# Patient Record
Sex: FEMALE | Race: WHITE | NOT HISPANIC OR LATINO | Employment: UNEMPLOYED | URBAN - NONMETROPOLITAN AREA
[De-identification: names, ages, dates, MRNs, and addresses within clinical notes are randomized per-mention and may not be internally consistent; named-entity substitution may affect disease eponyms.]

---

## 2022-10-25 ENCOUNTER — HOSPITAL ENCOUNTER (EMERGENCY)
Facility: HOSPITAL | Age: 62
Discharge: HOME | End: 2022-10-25
Attending: EMERGENCY MEDICINE
Payer: COMMERCIAL

## 2022-10-25 ENCOUNTER — APPOINTMENT (EMERGENCY)
Dept: RADIOLOGY | Facility: HOSPITAL | Age: 62
End: 2022-10-25
Payer: COMMERCIAL

## 2022-10-25 VITALS
SYSTOLIC BLOOD PRESSURE: 142 MMHG | TEMPERATURE: 98.3 F | HEART RATE: 82 BPM | RESPIRATION RATE: 18 BRPM | DIASTOLIC BLOOD PRESSURE: 75 MMHG | WEIGHT: 199.08 LBS | OXYGEN SATURATION: 95 %

## 2022-10-25 DIAGNOSIS — J40 BRONCHITIS: Primary | ICD-10-CM

## 2022-10-25 PROCEDURE — 71045 X-RAY EXAM CHEST 1 VIEW: CPT

## 2022-10-25 PROCEDURE — 99284 EMERGENCY DEPT VISIT MOD MDM: CPT | Performed by: EMERGENCY MEDICINE

## 2022-10-25 RX ORDER — AZITHROMYCIN 250 MG/1
TABLET, FILM COATED ORAL
Qty: 4 TABLET | Refills: 0 | Status: SHIPPED | OUTPATIENT
Start: 2022-10-25 | End: 2022-10-26

## 2022-10-25 RX ORDER — PREDNISONE 20 MG/1
60 TABLET ORAL DAILY
Qty: 12 TABLET | Refills: 0 | Status: SHIPPED | OUTPATIENT
Start: 2022-10-26 | End: 2022-10-30

## 2022-10-25 RX ORDER — AZITHROMYCIN 250 MG/1
500 TABLET, FILM COATED ORAL ONCE
Status: COMPLETED | OUTPATIENT
Start: 2022-10-25 | End: 2022-10-25

## 2022-10-25 RX ORDER — PREDNISONE 20 MG/1
60 TABLET ORAL ONCE
Status: COMPLETED | OUTPATIENT
Start: 2022-10-25 | End: 2022-10-25

## 2022-10-25 RX ADMIN — AZITHROMYCIN MONOHYDRATE 500 MG: 250 TABLET ORAL at 16:26

## 2022-10-25 RX ADMIN — PREDNISONE 60 MG: 20 TABLET ORAL at 16:26

## 2022-10-25 NOTE — ED PROVIDER NOTES
History  Chief Complaint   Patient presents with   • Cough     Patient states she has had a productive cough with thick yellow sputum for 2 months  Patient states she was diagnosed with the flu 3 weeks ago  70-year-old female presents for evaluation of cough  Patient reports cough starting approximately 2 months ago, at the onset she was placed on amoxicillin and given promethazine syrup from her PCP  Patient believes her symptoms did eventually start to remit however over the last 2 weeks they seem to have returned  Patient reports a intermittent sputum producing cough with yellow sputum, denies hemoptysis  She does report shortness of breath with coughing fits and believes movement exacerbates the cough  She denies other cold symptoms including a headaches, ear pain, sore throat, nasal congestion  She has been eating and drinking normally  Denies chest pain, abdominal pain, nausea or vomiting  She denies sick contacts  She does report history of asthma and has been using her inhalers at home  None       Past Medical History:   Diagnosis Date   • Arthritis    • Asthma    • COPD (chronic obstructive pulmonary disease) ([facility] [address] )        Past Surgical History:   Procedure Laterality Date   • TUBAL LIGATION         History reviewed  No pertinent family history  I have reviewed and agree with the history as documented  E-Cigarette/Vaping   • E-Cigarette Use Never User      E-Cigarette/Vaping Substances     Social History     Tobacco Use   • Smoking status: Never Smoker   • Smokeless tobacco: Never Used   Vaping Use   • Vaping Use: Never used   Substance Use Topics   • Drug use: Never       Review of Systems   Constitutional: Negative for activity change, appetite change, chills and fever  HENT: Negative for congestion, ear pain and sore throat  Respiratory: Positive for cough  Cardiovascular: Negative for chest pain and leg swelling     Gastrointestinal: Negative for abdominal pain, constipation, diarrhea, nausea and vomiting  Musculoskeletal: Negative for myalgias  Neurological: Negative for headaches  All other systems reviewed and are negative  Physical Exam  Physical Exam  Vitals reviewed  Constitutional:       General: She is not in acute distress  Appearance: Normal appearance  She is not ill-appearing, toxic-appearing or diaphoretic  HENT:      Head: Normocephalic and atraumatic  Right Ear: External ear normal       Left Ear: External ear normal       Nose: Nose normal       Mouth/Throat:      Mouth: Mucous membranes are moist    Eyes:      General: No scleral icterus  Right eye: No discharge  Left eye: No discharge  Extraocular Movements: Extraocular movements intact  Cardiovascular:      Rate and Rhythm: Normal rate and regular rhythm  Pulmonary:      Effort: Pulmonary effort is normal  No respiratory distress  Breath sounds: Normal breath sounds  No stridor  No wheezing, rhonchi or rales  Musculoskeletal:         General: No deformity or signs of injury  Right lower leg: No edema  Left lower leg: No edema  Skin:     General: Skin is warm  Coloration: Skin is not jaundiced or pale  Neurological:      General: No focal deficit present  Mental Status: She is alert  Mental status is at baseline        Gait: Gait normal          Vital Signs  ED Triage Vitals [10/25/22 1545]   Temperature Pulse Respirations Blood Pressure SpO2   98 3 °F (36 8 °C) 82 18 142/75 95 %      Temp Source Heart Rate Source Patient Position - Orthostatic VS BP Location FiO2 (%)   Tympanic Monitor Lying Right arm --      Pain Score       No Pain           Vitals:    10/25/22 1545   BP: 142/75   Pulse: 82   Patient Position - Orthostatic VS: Lying         Visual Acuity      ED Medications  Medications   azithromycin (ZITHROMAX) tablet 500 mg (500 mg Oral Given 10/25/22 1626)   predniSONE tablet 60 mg (60 mg Oral Given 10/25/22 1626) Diagnostic Studies  Results Reviewed     None                 XR chest 1 view portable    (Results Pending)              Procedures  Procedures         ED Course  ED Course as of 10/25/22 [address] Oct 25, 2022   1548 Blood Pressure: 142/75   1548 Temperature: 98 3 °F (36 8 °C)   1548 Pulse: 82   1548 Respirations: 18   1548 SpO2: 95 %   1618 XR chest 1 view portable  No focal consolidation, no acute cardiopulmonary disease                                             MDM  Number of Diagnoses or Management Options  Bronchitis  Diagnosis management comments: 61-year-old female presents for evaluation of cough  Patient reports symptoms started 2 months ago seem to remit however returned 2 weeks ago  Patient does report history of asthma, denies smoking  Likely element of bronchitis  Will obtain x-ray to rule out pneumonia, if no over pneumonia will treat with azithromycin and steroid burst   Patient reports this usually helps with her coughing flares  Disposition  Final diagnoses:   Bronchitis     Time reflects when diagnosis was documented in both MDM as applicable and the Disposition within this note     Time User Action Codes Description Comment    10/25/2022  4:19 PM Gabe [place] Add [J40] Bronchitis       ED Disposition     ED Disposition   Discharge    Condition   Stable    Date/Time   Tue Oct 25, 2022  4:19 PM    Comment   Jenna Monroe discharge to home/self care                 Follow-up Information     Follow up With Specialties Details Why Contact Info    [employer]   You may use this number to establish a [phone]            Discharge Medication List as of 10/25/2022  4:22 PM      START taking these medications    Details   azithromycin (ZITHROMAX) 250 mg tablet Take one tablet daily, Normal      predniSONE 20 mg tablet Take 3 tablets (60 mg total) by mouth daily for 4 days Do not start before October 26, 2022 , Starting Wed 10/26/2022, Until Sun 10/30/2022, Normal             No discharge procedures on file      PDMP Review     None          ED Provider  Electronically Signed by           Meggan Martinez DO  10/25/22 0955

## 2022-11-01 ENCOUNTER — HOSPITAL ENCOUNTER (EMERGENCY)
Facility: HOSPITAL | Age: 62
Discharge: HOME | End: 2022-11-01
Attending: EMERGENCY MEDICINE

## 2022-11-01 ENCOUNTER — APPOINTMENT (EMERGENCY)
Dept: RADIOLOGY | Facility: HOSPITAL | Age: 62
End: 2022-11-01

## 2022-11-01 VITALS
HEART RATE: 102 BPM | DIASTOLIC BLOOD PRESSURE: 70 MMHG | TEMPERATURE: 99.2 F | RESPIRATION RATE: 17 BRPM | OXYGEN SATURATION: 92 % | SYSTOLIC BLOOD PRESSURE: 145 MMHG

## 2022-11-01 DIAGNOSIS — R06.00 DYSPNEA: ICD-10-CM

## 2022-11-01 DIAGNOSIS — J40 BRONCHITIS: Primary | ICD-10-CM

## 2022-11-01 DIAGNOSIS — R05.9 COUGH: ICD-10-CM

## 2022-11-01 LAB
ATRIAL RATE: 100 BPM
FLUAV RNA RESP QL NAA+PROBE: NEGATIVE
FLUBV RNA RESP QL NAA+PROBE: NEGATIVE
P AXIS: 77 DEGREES
PR INTERVAL: 130 MS
QRS AXIS: 72 DEGREES
QRSD INTERVAL: 72 MS
QT INTERVAL: 338 MS
QTC INTERVAL: 436 MS
RSV RNA RESP QL NAA+PROBE: NEGATIVE
SARS-COV-2 RNA RESP QL NAA+PROBE: NEGATIVE
T WAVE AXIS: 71 DEGREES
VENTRICULAR RATE: 100 BPM

## 2022-11-01 RX ORDER — IPRATROPIUM BROMIDE AND ALBUTEROL SULFATE 2.5; .5 MG/3ML; MG/3ML
3 SOLUTION RESPIRATORY (INHALATION) 4 TIMES DAILY
Qty: 1080 ML | Refills: 0 | Status: SHIPPED | OUTPATIENT
Start: 2022-11-01 | End: 2023-01-30

## 2022-11-01 RX ORDER — BENZONATATE 100 MG/1
100 CAPSULE ORAL EVERY 8 HOURS
Qty: 21 CAPSULE | Refills: 0 | Status: SHIPPED | OUTPATIENT
Start: 2022-11-01

## 2022-11-01 RX ORDER — BENZONATATE 100 MG/1
100 CAPSULE ORAL ONCE
Status: COMPLETED | OUTPATIENT
Start: 2022-11-01 | End: 2022-11-01

## 2022-11-01 RX ADMIN — ALBUTEROL SULFATE 5 MG: 2.5 SOLUTION RESPIRATORY (INHALATION) at 16:16

## 2022-11-01 RX ADMIN — BENZONATATE 100 MG: 100 CAPSULE ORAL at 17:15

## 2022-11-01 RX ADMIN — IPRATROPIUM BROMIDE 0.5 MG: 0.5 SOLUTION RESPIRATORY (INHALATION) at 16:16

## 2022-11-01 NOTE — Clinical Note
Pratima Gilbert was seen and treated in our emergency department on 11/1/2022  Diagnosis:     Dottie Adkins  may return to work on return date  She may return on this date: 11/07/2022         If you have any questions or concerns, please don't hesitate to call        Wanda Lopez, DO    ______________________________           _______________          _______________  Hospital Representative                              Date                                Time

## 2022-11-01 NOTE — DISCHARGE INSTRUCTIONS
Rest and take medications as directed  Follow up with your primary care physician next available appointment  Return to emergency department if condition worsens

## 2022-11-01 NOTE — ED PROVIDER NOTES
History  Chief Complaint   Patient presents with   • Shortness of Breath     Patient states she was seen here a week ago and diagnosed with Bronchitis and put on prednisone and zithromax  Patient went back to work today and states she became feeling a lot worse  Patient complains of cough, SOB, chills, chest pain when coughing and back pain  Patient is a 63-year-old female presenting with cough and shortness of breath  Patient was here approximately 1 week ago and diagnosed with bronchitis and was placed on prednisone as well as Zithromax  Patient states her symptoms improved and her breathing improved  She does have a history of asthma  She does taking a albuterol inhaler but has not used nebulizer treatments at home  Denies fevers or chills  She states she went back to work today but her breathing became more labored and she was coughing quite frequently throughout the day that they sent her home from work  Patient denies any fevers or chills  Denies any chest pain  Denies any headaches  Feels generalized fatigue  Shortness of breath is worsened by coughing  No orthopnea  No history DVT or PE  No recent travel  Nonsmoker  Previous records reviewed  None       Past Medical History:   Diagnosis Date   • Arthritis    • Asthma    • COPD (chronic obstructive pulmonary disease) ([facility] [address] )        Past Surgical History:   Procedure Laterality Date   • TUBAL LIGATION         History reviewed  No pertinent family history  I have reviewed and agree with the history as documented  E-Cigarette/Vaping   • E-Cigarette Use Never User      E-Cigarette/Vaping Substances     Social History     Tobacco Use   • Smoking status: Never Smoker   • Smokeless tobacco: Never Used   Vaping Use   • Vaping Use: Never used   Substance Use Topics   • Drug use: Never       Review of Systems   Constitutional: Negative for appetite change, chills, fatigue, fever and unexpected weight change     HENT: Negative for congestion, ear pain, rhinorrhea and sore throat  Eyes: Negative for pain and visual disturbance  Respiratory: Positive for cough and shortness of breath  Negative for chest tightness and wheezing  Cardiovascular: Negative for chest pain, palpitations and leg swelling  Gastrointestinal: Negative for abdominal pain, constipation, diarrhea, nausea and vomiting  Genitourinary: Negative for difficulty urinating, dysuria, frequency, hematuria, menstrual problem, pelvic pain, vaginal bleeding and vaginal discharge  Musculoskeletal: Negative for arthralgias, back pain and neck pain  Skin: Negative for color change and rash  Neurological: Negative for dizziness, seizures, syncope, light-headedness and headaches  Psychiatric/Behavioral: Negative for confusion and sleep disturbance  The patient is not nervous/anxious  All other systems reviewed and are negative  Physical Exam  Physical Exam  Vitals and nursing note reviewed  Constitutional:       General: She is not in acute distress  Appearance: She is well-developed  She is not ill-appearing, toxic-appearing or diaphoretic  Comments: Non-productive cough during exam   HENT:      Head: Normocephalic and atraumatic  Mouth/Throat:      Mouth: Mucous membranes are moist    Eyes:      General: No scleral icterus  Extraocular Movements: Extraocular movements intact  Conjunctiva/sclera: Conjunctivae normal    Neck:      Vascular: No JVD  Cardiovascular:      Rate and Rhythm: Normal rate and regular rhythm  Pulses: Normal pulses  Heart sounds: Normal heart sounds  No murmur heard  No friction rub  No gallop  Pulmonary:      Effort: Pulmonary effort is normal  No tachypnea, accessory muscle usage or respiratory distress  Breath sounds: Normal breath sounds  No stridor  No decreased breath sounds, wheezing, rhonchi or rales  Chest:      Chest wall: No mass, deformity or crepitus     Abdominal: Palpations: Abdomen is soft  There is no mass  Tenderness: There is no abdominal tenderness  There is no guarding or rebound  Hernia: No hernia is present  Musculoskeletal:         General: Normal range of motion  Cervical back: Normal range of motion and neck supple  Right lower leg: No tenderness  No edema  Left lower leg: No tenderness  No edema  Lymphadenopathy:      Cervical: No cervical adenopathy  Skin:     General: Skin is warm and dry  Capillary Refill: Capillary refill takes less than 2 seconds  Coloration: Skin is not cyanotic or pale  Findings: No erythema  Nails: There is no clubbing  Neurological:      General: No focal deficit present  Mental Status: She is alert and oriented to person, place, and time     Psychiatric:         Mood and Affect: Mood normal          Behavior: Behavior normal          Vital Signs  ED Triage Vitals [11/01/22 1532]   Temperature Pulse Respirations Blood Pressure SpO2   99 2 °F (37 3 °C) 102 17 145/70 92 %      Temp Source Heart Rate Source Patient Position - Orthostatic VS BP Location FiO2 (%)   Temporal Monitor -- -- --      Pain Score       --           Vitals:    11/01/22 1532   BP: 145/70   Pulse: 102         Visual Acuity      ED Medications  Medications   benzonatate (TESSALON PERLES) capsule 100 mg (has no administration in time range)   ipratropium (ATROVENT) 0 02 % inhalation solution 0 5 mg (0 5 mg Nebulization Given 11/1/22 1616)   albuterol inhalation solution 5 mg (5 mg Nebulization Given 11/1/22 1616)       Diagnostic Studies  Results Reviewed     Procedure Component Value Units Date/Time    FLU/RSV/COVID - if FLU/RSV clinically relevant [[id number]]  (Normal) Collected: 11/01/22 1616    Lab Status: Final result Specimen: Nares from Nose Updated: 11/01/22 1659     SARS-CoV-2 Negative     INFLUENZA A PCR Negative     INFLUENZA B PCR Negative     RSV PCR Negative    Narrative:      FOR PEDIATRIC PATIENTS - copy/paste COVID Guidelines URL to browser: [email or web address]  [email or web address]    SARS-CoV-2 assay is a Nucleic Acid Amplification assay intended for the  qualitative detection of nucleic acid from SARS-CoV-2 in nasopharyngeal  swabs  Results are for the presumptive identification of SARS-CoV-2 RNA  Positive results are indicative of infection with SARS-CoV-2, the virus  causing COVID-19, but do not rule out bacterial infection or co-infection  with other viruses  Laboratories within the [place] and its  territories are required to report all positive results to the appropriate  public health authorities  Negative results do not preclude SARS-CoV-2  infection and should not be used as the sole basis for treatment or other  patient management decisions  Negative results must be combined with  clinical observations, patient history, and epidemiological information  This test has not been FDA cleared or approved  This test has been authorized by FDA under an Emergency Use Authorization  (EUA)  This test is only authorized for the duration of time the  declaration that circumstances exist justifying the authorization of the  emergency use of an in vitro diagnostic tests for detection of SARS-CoV-2  virus and/or diagnosis of COVID-19 infection under section 564(b)(1) of  the Act, 21 U  S C  432ABT-4(A)(3), unless the authorization is terminated  or revoked sooner  The test has been validated but independent review by FDA  and CLIA is pending  Test performed using [employer] Gene[employer]pert: This RT-PCR assay targets N2,  a region unique to SARS-CoV-2  A conserved region in the E-gene was chosen  for pan-Sarbecovirus detection which includes SARS-CoV-2  According to CMS-2020-01-R, this platform meets the definition of high-throughput technology                   XR chest 1 view portable    (Results Pending)       Wet read:     No definitive infiltrate or pneumothorax or pleural effusion    Procedures  ECG 12 Lead Documentation Only    Date/Time: 11/1/2022 4:08 PM  Performed by: Patti Forrest DO  Authorized by: Patti Forrest DO     Indications / Diagnosis:  Cough/SOB  ECG reviewed by me, the ED Provider: yes    Patient location:  ED  Rate:     ECG rate:  98    ECG rate assessment: normal    Rhythm:     Rhythm: sinus rhythm    Ectopy:     Ectopy: none    QRS:     QRS axis:  Normal    QRS intervals:  Normal  Conduction:     Conduction: normal    ST segments:     ST segments:  Normal  T waves:     T waves: normal               ED Course                               SBIRT 20yo+    Flowsheet Row Most Recent Value   SBIRT (23 yo +)    In order to provide better care to our patients, we are screening all of our patients for alcohol and drug use  Would it be okay to ask you these screening questions? No Filed at: 11/01/2022 1553                    MDM    Disposition  Final diagnoses:   Bronchitis   Dyspnea   Cough     Time reflects when diagnosis was documented in both MDM as applicable and the Disposition within this note     Time User Action Codes Description Comment    11/1/2022  5:06 PM Elvis Pies T Add [J40] Bronchitis     11/1/2022  5:06 PM Elvis Pies T Add [R06 00] Dyspnea     11/1/2022  5:07 PM Elvis Pies T Add [R05 9] Cough       ED Disposition     ED Disposition   Discharge    Condition   Stable    Date/Time   Tue Nov 1, 2022  5:06 PM    Comment   Diana Lancaster discharge to home/self care                 Follow-up Information    None         Patient's Medications   Discharge Prescriptions    BENZONATATE (TESSALON PERLES) 100 MG CAPSULE    Take 1 capsule (100 mg total) by mouth every 8 (eight) hours       Start Date: 11/1/2022 End Date: --       Order Dose: 100 mg       Quantity: 21 capsule    Refills: 0    IPRATROPIUM-ALBUTEROL (DUO-NEB) 0 5-2 5 MG/3 ML NEBULIZER SOLUTION    Take 3 mL by nebulization 4 (four) times a day       Start Date: 11/1/2022 End Date: 1/30/2023       Order Dose: 3 mL       Quantity: 1080 mL    Refills: 0       No discharge procedures on file      PDMP Review     None          ED Provider  Electronically Signed by           Maxi Moseley DO  11/01/22 2805

## 2024-01-10 ENCOUNTER — APPOINTMENT (OUTPATIENT)
Dept: URGENT CARE | Facility: CLINIC | Age: 64
End: 2024-01-10
Payer: OTHER MISCELLANEOUS

## 2024-01-10 PROCEDURE — G0382 LEV 3 HOSP TYPE B ED VISIT: HCPCS

## 2024-01-10 PROCEDURE — 99283 EMERGENCY DEPT VISIT LOW MDM: CPT

## 2024-01-17 ENCOUNTER — OCCMED (OUTPATIENT)
Dept: URGENT CARE | Facility: CLINIC | Age: 64
End: 2024-01-17
Payer: OTHER MISCELLANEOUS

## 2024-01-17 DIAGNOSIS — Z02.6 ENCOUNTER RELATED TO WORKER'S COMPENSATION CLAIM: Primary | ICD-10-CM

## 2024-01-17 PROCEDURE — 99213 OFFICE O/P EST LOW 20 MIN: CPT | Performed by: STUDENT IN AN ORGANIZED HEALTH CARE EDUCATION/TRAINING PROGRAM
